# Patient Record
Sex: FEMALE | Race: WHITE | NOT HISPANIC OR LATINO | Employment: OTHER | URBAN - METROPOLITAN AREA
[De-identification: names, ages, dates, MRNs, and addresses within clinical notes are randomized per-mention and may not be internally consistent; named-entity substitution may affect disease eponyms.]

---

## 2023-08-25 PROBLEM — G47.00 INSOMNIA: Status: ACTIVE | Noted: 2023-08-25

## 2023-08-25 PROBLEM — B00.1 HERPES LABIALIS: Status: ACTIVE | Noted: 2023-08-25

## 2023-08-25 PROBLEM — Z13.89 SCREENING FOR MULTIPLE CONDITIONS: Status: ACTIVE | Noted: 2023-08-25

## 2023-08-25 PROBLEM — K76.0 FATTY LIVER: Status: ACTIVE | Noted: 2023-08-25

## 2023-08-25 PROBLEM — M51.9 LUMBAR DISC DISEASE: Status: ACTIVE | Noted: 2023-08-25

## 2023-08-25 PROBLEM — R91.8 LUNG NODULES: Status: ACTIVE | Noted: 2023-08-25

## 2023-08-25 PROBLEM — Z87.42 H/O ABNORMAL CERVICAL PAPANICOLAOU SMEAR: Status: ACTIVE | Noted: 2023-08-25

## 2023-08-25 PROBLEM — Z86.0100 HISTORY OF COLONIC POLYPS: Status: ACTIVE | Noted: 2023-08-25

## 2023-08-25 PROBLEM — F41.9 ANXIETY: Status: ACTIVE | Noted: 2023-08-25

## 2023-08-25 PROBLEM — R93.1 ABNORMAL SCREENING CARDIAC CT: Status: ACTIVE | Noted: 2023-08-25

## 2023-08-25 PROBLEM — K64.9 HEMORRHOIDS: Status: ACTIVE | Noted: 2023-08-25

## 2023-08-25 PROBLEM — Z71.89 CARDIAC RISK COUNSELING: Status: ACTIVE | Noted: 2023-08-25

## 2023-08-25 PROBLEM — R87.622 PAPANICOLAOU SMEAR OF VAGINA WITH LOW GRADE SQUAMOUS INTRAEPITHELIAL LESION (LGSIL): Status: ACTIVE | Noted: 2023-08-25

## 2023-08-25 PROBLEM — F32.A DEPRESSION: Status: ACTIVE | Noted: 2023-08-25

## 2023-08-25 PROBLEM — Z00.00 ROUTINE ADULT HEALTH MAINTENANCE: Status: ACTIVE | Noted: 2023-08-25

## 2023-08-25 PROBLEM — I10 HYPERTENSION, ESSENTIAL, BENIGN: Status: ACTIVE | Noted: 2023-08-25

## 2023-08-25 PROBLEM — L50.9 URTICARIA: Status: ACTIVE | Noted: 2023-08-25

## 2023-08-25 PROBLEM — Z78.0 MENOPAUSE: Status: ACTIVE | Noted: 2023-08-25

## 2023-08-25 PROBLEM — E78.2 HYPERLIPIDEMIA, MIXED: Status: ACTIVE | Noted: 2023-08-25

## 2023-08-25 PROBLEM — N95.2 VAGINAL ATROPHY: Status: ACTIVE | Noted: 2023-08-25

## 2023-08-25 PROBLEM — Z71.89 ADVANCED DIRECTIVES, COUNSELING/DISCUSSION: Status: ACTIVE | Noted: 2023-08-25

## 2023-08-25 PROBLEM — G43.909 MIGRAINE: Status: ACTIVE | Noted: 2023-08-25

## 2023-08-25 PROBLEM — R73.01 IFG (IMPAIRED FASTING GLUCOSE): Status: ACTIVE | Noted: 2023-08-25

## 2023-08-25 PROBLEM — T78.3XXA ANGIOEDEMA: Status: ACTIVE | Noted: 2023-08-25

## 2023-08-25 PROBLEM — Z86.010 HISTORY OF COLONIC POLYPS: Status: ACTIVE | Noted: 2023-08-25

## 2023-08-25 PROBLEM — E55.9 VITAMIN D DEFICIENCY: Status: ACTIVE | Noted: 2023-08-25

## 2023-08-25 PROBLEM — M48.061 LUMBAR STENOSIS: Status: ACTIVE | Noted: 2023-08-25

## 2023-10-11 ENCOUNTER — TELEPHONE (OUTPATIENT)
Dept: OBSTETRICS AND GYNECOLOGY | Facility: CLINIC | Age: 68
End: 2023-10-11
Payer: MEDICARE

## 2023-10-11 DIAGNOSIS — Z78.0 MENOPAUSE: ICD-10-CM

## 2023-10-11 DIAGNOSIS — Z78.0 MENOPAUSE: Primary | ICD-10-CM

## 2023-10-11 RX ORDER — ESTRADIOL 0.05 MG/D
1 FILM, EXTENDED RELEASE TRANSDERMAL
Qty: 4 PATCH | Refills: 11 | Status: SHIPPED | OUTPATIENT
Start: 2023-10-11 | End: 2023-10-12

## 2023-10-12 RX ORDER — ESTRADIOL 0.05 MG/D
PATCH TRANSDERMAL
Qty: 4 PATCH | Refills: 11 | Status: SHIPPED | OUTPATIENT
Start: 2023-10-12 | End: 2024-02-09 | Stop reason: HOSPADM

## 2023-10-12 NOTE — TELEPHONE ENCOUNTER
Pharm sent electronic request for 0.05mg estradiol patch use. Verbal Clarification for new patch q 96 hrs (or 3-4d) given.

## 2023-10-16 ENCOUNTER — TELEPHONE (OUTPATIENT)
Dept: OBSTETRICS AND GYNECOLOGY | Facility: CLINIC | Age: 68
End: 2023-10-16
Payer: MEDICARE

## 2023-10-16 NOTE — TELEPHONE ENCOUNTER
Pt new Rx for estradiol patch states use 1 patch every 96hrs one a week. Pt states that she normally changes the patch twice a week. She was advised to stick with her application schedule.

## 2023-11-09 DIAGNOSIS — Z78.0 MENOPAUSE: ICD-10-CM

## 2023-11-09 RX ORDER — ESTRADIOL 0.05 MG/D
FILM, EXTENDED RELEASE TRANSDERMAL
Qty: 8 PATCH | Refills: 0 | Status: SHIPPED | OUTPATIENT
Start: 2023-11-09 | End: 2024-01-02

## 2023-11-10 NOTE — TELEPHONE ENCOUNTER
Pt contacted.     Pt informed dr thomas reviewed her pap from 9/20/23.   Provider advises pt repeat pap and HPV test in one year.     Understanding voiced.

## 2023-12-31 DIAGNOSIS — Z78.0 MENOPAUSE: ICD-10-CM

## 2024-01-02 RX ORDER — ESTRADIOL 0.05 MG/D
1 FILM, EXTENDED RELEASE TRANSDERMAL 2 TIMES WEEKLY
Qty: 24 PATCH | Refills: 3 | Status: SHIPPED | OUTPATIENT
Start: 2024-01-04

## 2024-02-08 ENCOUNTER — APPOINTMENT (OUTPATIENT)
Dept: CARDIOLOGY | Facility: HOSPITAL | Age: 69
End: 2024-02-08
Payer: MEDICARE

## 2024-02-08 ENCOUNTER — HOSPITAL ENCOUNTER (OUTPATIENT)
Facility: HOSPITAL | Age: 69
Setting detail: OBSERVATION
Discharge: HOME | End: 2024-02-09
Attending: EMERGENCY MEDICINE | Admitting: STUDENT IN AN ORGANIZED HEALTH CARE EDUCATION/TRAINING PROGRAM
Payer: MEDICARE

## 2024-02-08 ENCOUNTER — APPOINTMENT (OUTPATIENT)
Dept: RADIOLOGY | Facility: HOSPITAL | Age: 69
End: 2024-02-08
Payer: MEDICARE

## 2024-02-08 DIAGNOSIS — R07.9 CHEST PAIN, UNSPECIFIED TYPE: Primary | ICD-10-CM

## 2024-02-08 DIAGNOSIS — I20.89 OTHER FORMS OF ANGINA PECTORIS (CMS-HCC): ICD-10-CM

## 2024-02-08 LAB
ALBUMIN SERPL BCP-MCNC: 4.1 G/DL (ref 3.4–5)
ALP SERPL-CCNC: 38 U/L (ref 33–136)
ALT SERPL W P-5'-P-CCNC: 16 U/L (ref 7–45)
ANION GAP SERPL CALC-SCNC: 13 MMOL/L (ref 10–20)
ANION GAP SERPL CALC-SCNC: 14 MMOL/L (ref 10–20)
APTT PPP: 30 SECONDS (ref 27–38)
AST SERPL W P-5'-P-CCNC: 30 U/L (ref 9–39)
BASOPHILS # BLD AUTO: 0.05 X10*3/UL (ref 0–0.1)
BASOPHILS NFR BLD AUTO: 1.1 %
BILIRUB SERPL-MCNC: 0.5 MG/DL (ref 0–1.2)
BNP SERPL-MCNC: 8 PG/ML (ref 0–99)
BUN SERPL-MCNC: 20 MG/DL (ref 6–23)
BUN SERPL-MCNC: 21 MG/DL (ref 6–23)
CALCIUM SERPL-MCNC: 8.7 MG/DL (ref 8.6–10.3)
CALCIUM SERPL-MCNC: 9.3 MG/DL (ref 8.6–10.3)
CARDIAC TROPONIN I PNL SERPL HS: <3 NG/L (ref 0–13)
CHLORIDE SERPL-SCNC: 100 MMOL/L (ref 98–107)
CHLORIDE SERPL-SCNC: 99 MMOL/L (ref 98–107)
CO2 SERPL-SCNC: 27 MMOL/L (ref 21–32)
CO2 SERPL-SCNC: 28 MMOL/L (ref 21–32)
CREAT SERPL-MCNC: 0.53 MG/DL (ref 0.5–1.05)
CREAT SERPL-MCNC: 0.59 MG/DL (ref 0.5–1.05)
EGFRCR SERPLBLD CKD-EPI 2021: >90 ML/MIN/1.73M*2
EGFRCR SERPLBLD CKD-EPI 2021: >90 ML/MIN/1.73M*2
EOSINOPHIL # BLD AUTO: 0.12 X10*3/UL (ref 0–0.4)
EOSINOPHIL NFR BLD AUTO: 2.7 %
ERYTHROCYTE [DISTWIDTH] IN BLOOD BY AUTOMATED COUNT: 12.3 % (ref 11.5–14.5)
ERYTHROCYTE [DISTWIDTH] IN BLOOD BY AUTOMATED COUNT: 12.4 % (ref 11.5–14.5)
EST. AVERAGE GLUCOSE BLD GHB EST-MCNC: 117 MG/DL
GLUCOSE SERPL-MCNC: 104 MG/DL (ref 74–99)
GLUCOSE SERPL-MCNC: 142 MG/DL (ref 74–99)
HBA1C MFR BLD: 5.7 %
HCT VFR BLD AUTO: 43.9 % (ref 36–46)
HCT VFR BLD AUTO: 46 % (ref 36–46)
HGB BLD-MCNC: 15.1 G/DL (ref 12–16)
HGB BLD-MCNC: 16 G/DL (ref 12–16)
IMM GRANULOCYTES # BLD AUTO: 0.03 X10*3/UL (ref 0–0.5)
IMM GRANULOCYTES NFR BLD AUTO: 0.7 % (ref 0–0.9)
INR PPP: 1 (ref 0.9–1.1)
LYMPHOCYTES # BLD AUTO: 1.49 X10*3/UL (ref 0.8–3)
LYMPHOCYTES NFR BLD AUTO: 34.1 %
MCH RBC QN AUTO: 30.8 PG (ref 26–34)
MCH RBC QN AUTO: 31.3 PG (ref 26–34)
MCHC RBC AUTO-ENTMCNC: 34.4 G/DL (ref 32–36)
MCHC RBC AUTO-ENTMCNC: 34.8 G/DL (ref 32–36)
MCV RBC AUTO: 89 FL (ref 80–100)
MCV RBC AUTO: 90 FL (ref 80–100)
MONOCYTES # BLD AUTO: 0.38 X10*3/UL (ref 0.05–0.8)
MONOCYTES NFR BLD AUTO: 8.7 %
NEUTROPHILS # BLD AUTO: 2.3 X10*3/UL (ref 1.6–5.5)
NEUTROPHILS NFR BLD AUTO: 52.7 %
NRBC BLD-RTO: 0 /100 WBCS (ref 0–0)
NRBC BLD-RTO: 0 /100 WBCS (ref 0–0)
PLATELET # BLD AUTO: 114 X10*3/UL (ref 150–450)
PLATELET # BLD AUTO: 124 X10*3/UL (ref 150–450)
POTASSIUM SERPL-SCNC: 3.1 MMOL/L (ref 3.5–5.3)
POTASSIUM SERPL-SCNC: 5.3 MMOL/L (ref 3.5–5.3)
PROT SERPL-MCNC: 6.6 G/DL (ref 6.4–8.2)
PROTHROMBIN TIME: 11 SECONDS (ref 9.8–12.8)
RBC # BLD AUTO: 4.91 X10*6/UL (ref 4–5.2)
RBC # BLD AUTO: 5.11 X10*6/UL (ref 4–5.2)
SODIUM SERPL-SCNC: 134 MMOL/L (ref 136–145)
SODIUM SERPL-SCNC: 139 MMOL/L (ref 136–145)
WBC # BLD AUTO: 4.4 X10*3/UL (ref 4.4–11.3)
WBC # BLD AUTO: 5.4 X10*3/UL (ref 4.4–11.3)

## 2024-02-08 PROCEDURE — 36415 COLL VENOUS BLD VENIPUNCTURE: CPT | Performed by: PHYSICIAN ASSISTANT

## 2024-02-08 PROCEDURE — 71046 X-RAY EXAM CHEST 2 VIEWS: CPT | Mod: FY

## 2024-02-08 PROCEDURE — 99223 1ST HOSP IP/OBS HIGH 75: CPT | Performed by: NURSE PRACTITIONER

## 2024-02-08 PROCEDURE — 84484 ASSAY OF TROPONIN QUANT: CPT | Performed by: STUDENT IN AN ORGANIZED HEALTH CARE EDUCATION/TRAINING PROGRAM

## 2024-02-08 PROCEDURE — G0378 HOSPITAL OBSERVATION PER HR: HCPCS

## 2024-02-08 PROCEDURE — 85610 PROTHROMBIN TIME: CPT | Performed by: NURSE PRACTITIONER

## 2024-02-08 PROCEDURE — 2500000001 HC RX 250 WO HCPCS SELF ADMINISTERED DRUGS (ALT 637 FOR MEDICARE OP): Performed by: STUDENT IN AN ORGANIZED HEALTH CARE EDUCATION/TRAINING PROGRAM

## 2024-02-08 PROCEDURE — 99285 EMERGENCY DEPT VISIT HI MDM: CPT | Performed by: PHYSICIAN ASSISTANT

## 2024-02-08 PROCEDURE — 99285 EMERGENCY DEPT VISIT HI MDM: CPT | Mod: 25,27 | Performed by: EMERGENCY MEDICINE

## 2024-02-08 PROCEDURE — 2500000004 HC RX 250 GENERAL PHARMACY W/ HCPCS (ALT 636 FOR OP/ED): Performed by: NURSE PRACTITIONER

## 2024-02-08 PROCEDURE — 83036 HEMOGLOBIN GLYCOSYLATED A1C: CPT | Mod: STJLAB | Performed by: NURSE PRACTITIONER

## 2024-02-08 PROCEDURE — 71046 X-RAY EXAM CHEST 2 VIEWS: CPT | Mod: COMPUTED RADIOGRAPHY X-RAY | Performed by: RADIOLOGY

## 2024-02-08 PROCEDURE — 93010 ELECTROCARDIOGRAM REPORT: CPT | Performed by: PHYSICIAN ASSISTANT

## 2024-02-08 PROCEDURE — 85027 COMPLETE CBC AUTOMATED: CPT | Performed by: NURSE PRACTITIONER

## 2024-02-08 PROCEDURE — 84484 ASSAY OF TROPONIN QUANT: CPT | Performed by: PHYSICIAN ASSISTANT

## 2024-02-08 PROCEDURE — 94760 N-INVAS EAR/PLS OXIMETRY 1: CPT

## 2024-02-08 PROCEDURE — 2500000004 HC RX 250 GENERAL PHARMACY W/ HCPCS (ALT 636 FOR OP/ED): Performed by: INTERNAL MEDICINE

## 2024-02-08 PROCEDURE — 82374 ASSAY BLOOD CARBON DIOXIDE: CPT | Performed by: NURSE PRACTITIONER

## 2024-02-08 PROCEDURE — 93005 ELECTROCARDIOGRAM TRACING: CPT

## 2024-02-08 PROCEDURE — 96372 THER/PROPH/DIAG INJ SC/IM: CPT

## 2024-02-08 PROCEDURE — 80053 COMPREHEN METABOLIC PANEL: CPT | Performed by: PHYSICIAN ASSISTANT

## 2024-02-08 PROCEDURE — 83880 ASSAY OF NATRIURETIC PEPTIDE: CPT | Performed by: PHYSICIAN ASSISTANT

## 2024-02-08 PROCEDURE — 85025 COMPLETE CBC W/AUTO DIFF WBC: CPT | Performed by: PHYSICIAN ASSISTANT

## 2024-02-08 RX ORDER — ATORVASTATIN CALCIUM 80 MG/1
80 TABLET, FILM COATED ORAL NIGHTLY
Status: DISCONTINUED | OUTPATIENT
Start: 2024-02-08 | End: 2024-02-09 | Stop reason: HOSPADM

## 2024-02-08 RX ORDER — PRAVASTATIN SODIUM 20 MG/1
20 TABLET ORAL DAILY
COMMUNITY

## 2024-02-08 RX ORDER — POLYETHYLENE GLYCOL 3350 17 G/17G
17 POWDER, FOR SOLUTION ORAL DAILY PRN
Status: DISCONTINUED | OUTPATIENT
Start: 2024-02-08 | End: 2024-02-09 | Stop reason: HOSPADM

## 2024-02-08 RX ORDER — ATORVASTATIN CALCIUM 80 MG/1
80 TABLET, FILM COATED ORAL DAILY
Status: DISCONTINUED | OUTPATIENT
Start: 2024-02-09 | End: 2024-02-08

## 2024-02-08 RX ORDER — NITROGLYCERIN 0.4 MG/1
0.4 TABLET SUBLINGUAL EVERY 5 MIN PRN
Status: DISCONTINUED | OUTPATIENT
Start: 2024-02-08 | End: 2024-02-09 | Stop reason: HOSPADM

## 2024-02-08 RX ORDER — TRAZODONE HYDROCHLORIDE 50 MG/1
50 TABLET ORAL NIGHTLY PRN
Status: DISCONTINUED | OUTPATIENT
Start: 2024-02-08 | End: 2024-02-09 | Stop reason: HOSPADM

## 2024-02-08 RX ORDER — LOSARTAN POTASSIUM 100 MG/1
100 TABLET ORAL DAILY
COMMUNITY

## 2024-02-08 RX ORDER — ACETAMINOPHEN 650 MG/1
650 SUPPOSITORY RECTAL EVERY 4 HOURS PRN
Status: DISCONTINUED | OUTPATIENT
Start: 2024-02-08 | End: 2024-02-09 | Stop reason: HOSPADM

## 2024-02-08 RX ORDER — HEPARIN SODIUM 5000 [USP'U]/ML
5000 INJECTION, SOLUTION INTRAVENOUS; SUBCUTANEOUS EVERY 8 HOURS SCHEDULED
Status: DISCONTINUED | OUTPATIENT
Start: 2024-02-08 | End: 2024-02-09 | Stop reason: HOSPADM

## 2024-02-08 RX ORDER — ACETAMINOPHEN 325 MG/1
650 TABLET ORAL EVERY 4 HOURS PRN
Status: DISCONTINUED | OUTPATIENT
Start: 2024-02-08 | End: 2024-02-09 | Stop reason: HOSPADM

## 2024-02-08 RX ORDER — EPINEPHRINE 0.22MG
100 AEROSOL WITH ADAPTER (ML) INHALATION DAILY
COMMUNITY

## 2024-02-08 RX ORDER — GABAPENTIN 300 MG/1
300 CAPSULE ORAL 2 TIMES DAILY
Status: DISCONTINUED | OUTPATIENT
Start: 2024-02-08 | End: 2024-02-09 | Stop reason: HOSPADM

## 2024-02-08 RX ORDER — POTASSIUM CHLORIDE 750 MG/1
10 CAPSULE, EXTENDED RELEASE ORAL 2 TIMES DAILY
COMMUNITY

## 2024-02-08 RX ORDER — MORPHINE SULFATE 2 MG/ML
2 INJECTION, SOLUTION INTRAMUSCULAR; INTRAVENOUS EVERY 5 MIN PRN
Status: DISCONTINUED | OUTPATIENT
Start: 2024-02-08 | End: 2024-02-09 | Stop reason: HOSPADM

## 2024-02-08 RX ORDER — ACETAMINOPHEN 160 MG/5ML
650 SOLUTION ORAL EVERY 4 HOURS PRN
Status: DISCONTINUED | OUTPATIENT
Start: 2024-02-08 | End: 2024-02-09 | Stop reason: HOSPADM

## 2024-02-08 RX ORDER — NAPROXEN SODIUM 220 MG/1
81 TABLET, FILM COATED ORAL DAILY
Status: DISCONTINUED | OUTPATIENT
Start: 2024-02-09 | End: 2024-02-09 | Stop reason: HOSPADM

## 2024-02-08 RX ORDER — GABAPENTIN 300 MG/1
300 CAPSULE ORAL 2 TIMES DAILY
COMMUNITY

## 2024-02-08 RX ORDER — CHLORTHALIDONE 25 MG/1
25 TABLET ORAL DAILY
COMMUNITY

## 2024-02-08 RX ORDER — TRAZODONE HYDROCHLORIDE 50 MG/1
50 TABLET ORAL NIGHTLY PRN
COMMUNITY

## 2024-02-08 RX ORDER — CHLORTHALIDONE 25 MG/1
25 TABLET ORAL DAILY
Status: DISCONTINUED | OUTPATIENT
Start: 2024-02-08 | End: 2024-02-09 | Stop reason: HOSPADM

## 2024-02-08 RX ORDER — POTASSIUM CHLORIDE 20 MEQ/1
60 TABLET, EXTENDED RELEASE ORAL ONCE
Status: COMPLETED | OUTPATIENT
Start: 2024-02-08 | End: 2024-02-08

## 2024-02-08 RX ORDER — LOSARTAN POTASSIUM 100 MG/1
100 TABLET ORAL DAILY
Status: DISCONTINUED | OUTPATIENT
Start: 2024-02-08 | End: 2024-02-09 | Stop reason: HOSPADM

## 2024-02-08 RX ORDER — METOPROLOL TARTRATE 25 MG/1
25 TABLET, FILM COATED ORAL 2 TIMES DAILY
Status: DISCONTINUED | OUTPATIENT
Start: 2024-02-08 | End: 2024-02-09 | Stop reason: HOSPADM

## 2024-02-08 RX ORDER — ATORVASTATIN CALCIUM 80 MG/1
80 TABLET, FILM COATED ORAL DAILY
Status: DISCONTINUED | OUTPATIENT
Start: 2024-02-08 | End: 2024-02-08

## 2024-02-08 RX ORDER — ONDANSETRON HYDROCHLORIDE 2 MG/ML
4 INJECTION, SOLUTION INTRAVENOUS EVERY 6 HOURS PRN
Status: DISCONTINUED | OUTPATIENT
Start: 2024-02-08 | End: 2024-02-09 | Stop reason: HOSPADM

## 2024-02-08 RX ADMIN — GABAPENTIN 300 MG: 300 CAPSULE ORAL at 20:20

## 2024-02-08 RX ADMIN — HEPARIN SODIUM 5000 UNITS: 5000 INJECTION INTRAVENOUS; SUBCUTANEOUS at 16:40

## 2024-02-08 RX ADMIN — CHLORTHALIDONE 25 MG: 25 TABLET ORAL at 17:36

## 2024-02-08 RX ADMIN — METOPROLOL TARTRATE 25 MG: 25 TABLET, FILM COATED ORAL at 16:40

## 2024-02-08 RX ADMIN — HEPARIN SODIUM 5000 UNITS: 5000 INJECTION INTRAVENOUS; SUBCUTANEOUS at 23:12

## 2024-02-08 RX ADMIN — LOSARTAN POTASSIUM 100 MG: 100 TABLET, FILM COATED ORAL at 16:40

## 2024-02-08 RX ADMIN — POTASSIUM CHLORIDE 60 MEQ: 1500 TABLET, EXTENDED RELEASE ORAL at 23:11

## 2024-02-08 SDOH — SOCIAL STABILITY: SOCIAL INSECURITY: HAVE YOU HAD THOUGHTS OF HARMING ANYONE ELSE?: NO

## 2024-02-08 SDOH — SOCIAL STABILITY: SOCIAL INSECURITY: HAS ANYONE EVER THREATENED TO HURT YOUR FAMILY OR YOUR PETS?: NO

## 2024-02-08 SDOH — SOCIAL STABILITY: SOCIAL INSECURITY: DO YOU FEEL ANYONE HAS EXPLOITED OR TAKEN ADVANTAGE OF YOU FINANCIALLY OR OF YOUR PERSONAL PROPERTY?: NO

## 2024-02-08 SDOH — SOCIAL STABILITY: SOCIAL INSECURITY: DO YOU FEEL UNSAFE GOING BACK TO THE PLACE WHERE YOU ARE LIVING?: NO

## 2024-02-08 SDOH — SOCIAL STABILITY: SOCIAL INSECURITY: DOES ANYONE TRY TO KEEP YOU FROM HAVING/CONTACTING OTHER FRIENDS OR DOING THINGS OUTSIDE YOUR HOME?: NO

## 2024-02-08 SDOH — SOCIAL STABILITY: SOCIAL INSECURITY: ARE YOU OR HAVE YOU BEEN THREATENED OR ABUSED PHYSICALLY, EMOTIONALLY, OR SEXUALLY BY ANYONE?: NO

## 2024-02-08 SDOH — SOCIAL STABILITY: SOCIAL INSECURITY: ARE THERE ANY APPARENT SIGNS OF INJURIES/BEHAVIORS THAT COULD BE RELATED TO ABUSE/NEGLECT?: NO

## 2024-02-08 SDOH — SOCIAL STABILITY: SOCIAL INSECURITY: ABUSE: ADULT

## 2024-02-08 SDOH — SOCIAL STABILITY: SOCIAL INSECURITY: WERE YOU ABLE TO COMPLETE ALL THE BEHAVIORAL HEALTH SCREENINGS?: YES

## 2024-02-08 ASSESSMENT — COGNITIVE AND FUNCTIONAL STATUS - GENERAL
PATIENT BASELINE BEDBOUND: NO
MOBILITY SCORE: 24
DAILY ACTIVITIY SCORE: 24

## 2024-02-08 ASSESSMENT — ENCOUNTER SYMPTOMS
COUGH: 0
CHEST TIGHTNESS: 0
ENDOCRINE NEGATIVE: 1
NEUROLOGICAL NEGATIVE: 1
EYES NEGATIVE: 1
SHORTNESS OF BREATH: 0
CHILLS: 0
PALPITATIONS: 0
MUSCULOSKELETAL NEGATIVE: 1
FATIGUE: 0
ACTIVITY CHANGE: 0
UNEXPECTED WEIGHT CHANGE: 0
GASTROINTESTINAL NEGATIVE: 1
PSYCHIATRIC NEGATIVE: 1

## 2024-02-08 ASSESSMENT — ACTIVITIES OF DAILY LIVING (ADL)
FEEDING YOURSELF: INDEPENDENT
JUDGMENT_ADEQUATE_SAFELY_COMPLETE_DAILY_ACTIVITIES: YES
LACK_OF_TRANSPORTATION: PATIENT DECLINED
TOILETING: INDEPENDENT
GROOMING: INDEPENDENT
HEARING - RIGHT EAR: FUNCTIONAL
PATIENT'S MEMORY ADEQUATE TO SAFELY COMPLETE DAILY ACTIVITIES?: YES
WALKS IN HOME: INDEPENDENT
DRESSING YOURSELF: INDEPENDENT
ADEQUATE_TO_COMPLETE_ADL: YES
LACK_OF_TRANSPORTATION: PATIENT DECLINED
BATHING: INDEPENDENT
HEARING - LEFT EAR: FUNCTIONAL

## 2024-02-08 ASSESSMENT — LIFESTYLE VARIABLES
HAS A RELATIVE, FRIEND, DOCTOR, OR ANOTHER HEALTH PROFESSIONAL EXPRESSED CONCERN ABOUT YOUR DRINKING OR SUGGESTED YOU CUT DOWN: NO
SKIP TO QUESTIONS 9-10: 1
HOW OFTEN DO YOU HAVE 6 OR MORE DRINKS ON ONE OCCASION: NEVER
HOW MANY STANDARD DRINKS CONTAINING ALCOHOL DO YOU HAVE ON A TYPICAL DAY: 1 OR 2
HOW OFTEN DURING THE LAST YEAR HAVE YOU FOUND THAT YOU WERE NOT ABLE TO STOP DRINKING ONCE YOU HAD STARTED: NEVER
HOW OFTEN DURING THE LAST YEAR HAVE YOU BEEN UNABLE TO REMEMBER WHAT HAPPENED THE NIGHT BEFORE BECAUSE YOU HAD BEEN DRINKING: NEVER
EVER HAD A DRINK FIRST THING IN THE MORNING TO STEADY YOUR NERVES TO GET RID OF A HANGOVER: NO
AUDIT TOTAL SCORE: 0
HAVE YOU OR SOMEONE ELSE BEEN INJURED AS A RESULT OF YOUR DRINKING: NO
AUDIT TOTAL SCORE: 3
HOW OFTEN DURING THE LAST YEAR HAVE YOU HAD A FEELING OF GUILT OR REMORSE AFTER DRINKING: NEVER
EVER FELT BAD OR GUILTY ABOUT YOUR DRINKING: NO
HOW OFTEN DO YOU HAVE A DRINK CONTAINING ALCOHOL: 2-3 TIMES A WEEK
AUDIT-C TOTAL SCORE: 3
HOW OFTEN DURING THE LAST YEAR HAVE YOU FAILED TO DO WHAT WAS NORMALLY EXPECTED FROM YOU BECAUSE OF DRINKING: NEVER
HAVE PEOPLE ANNOYED YOU BY CRITICIZING YOUR DRINKING: NO
HAVE YOU EVER FELT YOU SHOULD CUT DOWN ON YOUR DRINKING: NO
AUDIT-C TOTAL SCORE: 3
HOW OFTEN DURING THE LAST YEAR HAVE YOU NEEDED AN ALCOHOLIC DRINK FIRST THING IN THE MORNING TO GET YOURSELF GOING AFTER A NIGHT OF HEAVY DRINKING: NEVER

## 2024-02-08 ASSESSMENT — PATIENT HEALTH QUESTIONNAIRE - PHQ9
1. LITTLE INTEREST OR PLEASURE IN DOING THINGS: NOT AT ALL
SUM OF ALL RESPONSES TO PHQ9 QUESTIONS 1 & 2: 0
2. FEELING DOWN, DEPRESSED OR HOPELESS: NOT AT ALL

## 2024-02-08 ASSESSMENT — PAIN - FUNCTIONAL ASSESSMENT
PAIN_FUNCTIONAL_ASSESSMENT: 0-10

## 2024-02-08 ASSESSMENT — PAIN SCALES - GENERAL
PAINLEVEL_OUTOF10: 0 - NO PAIN
PAINLEVEL_OUTOF10: 0 - NO PAIN

## 2024-02-08 ASSESSMENT — COLUMBIA-SUICIDE SEVERITY RATING SCALE - C-SSRS
6. HAVE YOU EVER DONE ANYTHING, STARTED TO DO ANYTHING, OR PREPARED TO DO ANYTHING TO END YOUR LIFE?: NO
6. HAVE YOU EVER DONE ANYTHING, STARTED TO DO ANYTHING, OR PREPARED TO DO ANYTHING TO END YOUR LIFE?: NO
1. IN THE PAST MONTH, HAVE YOU WISHED YOU WERE DEAD OR WISHED YOU COULD GO TO SLEEP AND NOT WAKE UP?: NO
2. HAVE YOU ACTUALLY HAD ANY THOUGHTS OF KILLING YOURSELF?: NO
1. IN THE PAST MONTH, HAVE YOU WISHED YOU WERE DEAD OR WISHED YOU COULD GO TO SLEEP AND NOT WAKE UP?: NO
2. HAVE YOU ACTUALLY HAD ANY THOUGHTS OF KILLING YOURSELF?: NO

## 2024-02-08 NOTE — H&P
"History Of Present Illness  Jen Simpson is a 69 y.o. female presents to [facility] ER with history of hypertension hyperlipidemia, who presents today for evaluation of chest pain that started while sitting having a cup of coffee. Patient states she experienced left arm pain that radiated to her left side of the chest, described as an \"ache\" rated a 5 out of 1-10. She described some shortness of breath, no nausea, vomiting or diaphoresis. She lives in [place] and here visiting her daughter. EMS was called, they gave her 4 aspirins and nitroglycerin, the nitroglycerin. She believes the chest pain resolved enroute but prior to nitroglycerin administration. She does use estrogen patches. No recent surgeries hospitalizations. Had recent travel aside from a 2-hour flight from [place] to [place] and then [place] to [place] during which patient was walking around every 2 hours, per ER report.   She denies history of diabetes or family history of heart disease. Patient admitted for ACS r/o, cardiology consulted     Past Medical History  Hyperlipemia, hypertension, depression, anxiety    Surgical History  She has a past surgical history that includes Tonsillectomy (06/06/2014); Back surgery (06/06/2014); Hysterectomy (06/06/2014); Biopsy skin / SQ / mucous membrane (09/22/2022); Colonoscopy (08/22/2017); and Skin biopsy (10/03/2014).     Social History  She reports that she has never smoked. She has never used smokeless tobacco. She reports that she does not use drugs. No history on file for alcohol use.    Family History  No family history on file.     Allergies  Patient has no known allergies.    Review of Systems   Constitutional:  Negative for activity change, chills, fatigue and unexpected weight change.   HENT: Negative.     Eyes: Negative.    Respiratory:  Negative for cough, chest tightness and shortness of breath.    Cardiovascular:  Positive for chest pain. Negative for palpitations and leg swelling. " "  Gastrointestinal: Negative.    Endocrine: Negative.    Genitourinary: Negative.    Musculoskeletal: Negative.    Neurological: Negative.    Psychiatric/Behavioral: Negative.        ROS: 12 systems reviewed and negative except per HPI above     Physical Exam by System:     Constitutional: Well developed, awake/alert/oriented x3, no distress, alert and cooperative   ENMT: mucous membranes moist   Head/Neck: Neck supple   Respiratory/Thorax: Patent airways, CTAB, normal breath sounds with good chest expansion, thorax symmetric   Cardiovascular: Regular, rate and rhythm, no murmurs, 2+ equal pulses of the extremities, normal S 1and S 2   Gastrointestinal: Nondistended, soft, non-tender, no rebound tenderness or guarding, no masses palpable, no organomegaly, +BS, no bruits   Musculoskeletal: ROM intact, no joint swelling, normal strength   Extremities: normal extremities, no cyanosis edema, contusions or wounds, no clubbing   Neurological: alert and oriented x3, intact senses, motor, response and reflexes, normal strength       Last Recorded Vitals  Blood pressure 142/72, pulse 82, temperature 36.7 °C (98.1 °F), resp. rate 16, height 1.676 m (5' 6\"), weight 68.9 kg (152 lb), SpO2 96 %.    Relevant Results  Results for orders placed or performed during the hospital encounter of 02/08/24 (from the past 24 hour(s))   CBC and Auto Differential   Result Value Ref Range    WBC 4.4 4.4 - 11.3 x10*3/uL    nRBC 0.0 0.0 - 0.0 /100 WBCs    RBC 4.91 4.00 - 5.20 x10*6/uL    Hemoglobin 15.1 12.0 - 16.0 g/dL    Hematocrit 43.9 36.0 - 46.0 %    MCV 89 80 - 100 fL    MCH 30.8 26.0 - 34.0 pg    MCHC 34.4 32.0 - 36.0 g/dL    RDW 12.4 11.5 - 14.5 %    Platelets 114 (L) 150 - 450 x10*3/uL    Neutrophils % 52.7 40.0 - 80.0 %    Immature Granulocytes %, Automated 0.7 0.0 - 0.9 %    Lymphocytes % 34.1 13.0 - 44.0 %    Monocytes % 8.7 2.0 - 10.0 %    Eosinophils % 2.7 0.0 - 6.0 %    Basophils % 1.1 0.0 - 2.0 %    Neutrophils Absolute 2.30 1.60 " - 5.50 x10*3/uL    Immature Granulocytes Absolute, Automated 0.03 0.00 - 0.50 x10*3/uL    Lymphocytes Absolute 1.49 0.80 - 3.00 x10*3/uL    Monocytes Absolute 0.38 0.05 - 0.80 x10*3/uL    Eosinophils Absolute 0.12 0.00 - 0.40 x10*3/uL    Basophils Absolute 0.05 0.00 - 0.10 x10*3/uL   Comprehensive metabolic panel   Result Value Ref Range    Glucose 142 (H) 74 - 99 mg/dL    Sodium 134 (L) 136 - 145 mmol/L    Potassium 5.3 3.5 - 5.3 mmol/L    Chloride 99 98 - 107 mmol/L    Bicarbonate 27 21 - 32 mmol/L    Anion Gap 13 10 - 20 mmol/L    Urea Nitrogen 21 6 - 23 mg/dL    Creatinine 0.53 0.50 - 1.05 mg/dL    eGFR >90 >60 mL/min/1.73m*2    Calcium 8.7 8.6 - 10.3 mg/dL    Albumin 4.1 3.4 - 5.0 g/dL    Alkaline Phosphatase 38 33 - 136 U/L    Total Protein 6.6 6.4 - 8.2 g/dL    AST 30 9 - 39 U/L    Bilirubin, Total 0.5 0.0 - 1.2 mg/dL    ALT 16 7 - 45 U/L   B-type natriuretic peptide   Result Value Ref Range    BNP 8 0 - 99 pg/mL   Troponin I, High Sensitivity, Initial   Result Value Ref Range    Troponin I, High Sensitivity <3 0 - 13 ng/L   Troponin, High Sensitivity, 1 Hour   Result Value Ref Range    Troponin I, High Sensitivity <3 0 - 13 ng/L      Scheduled medications    Continuous medications    PRN medications       XR chest 2 views    Result Date: 2/8/2024  Interpreted By:  Antonio Mcclure, STUDY: XR CHEST 2 VIEWS;  2/8/2024 9:53 am   INDICATION: Signs/Symptoms:cp sob.   COMPARISON: None.   ACCESSION NUMBER(S): [id number]   ORDERING CLINICIAN: CELIA MENDOZA   FINDINGS:     CARDIOMEDIASTINAL SILHOUETTE: Cardiomediastinal silhouette is normal in size and configuration.   LUNGS: No consolidation, pneumothorax, or effusion.   ABDOMEN: No remarkable upper abdominal findings.   BONES: No acute osseous changes.   Remaining findings appear stable since prior comparison radiograph.       1.  No evidence of acute cardiopulmonary process.     Signed by: Antonio Mcclure 2/8/2024 11:00 AM Dictation workstation:   [id number]         Assessment/Plan   Principal Problem:    Chest pain  Active Problems:    Vitamin D deficiency    Fatty liver    Hypertension, essential, benign    Hyperlipidemia, mixed    Anxiety      Plan:    Admit to observation with tele  Cardiology consult to Dr. Diallo  Troponin 3, delta troponin 3  BNP 8  CXR: No evidence of acute cardiopulmonary process.   Nitroglycerin SL for angina, morphine PRN for chest pain  Aspirin daily, atorvastatin, BB  Lipid profile ordered, A1C ordered  Am labs including cbc, bmp, mag  POC discussed with patient and attending  Resume patient home medications when med rec is reconcilled  I spent >50 minutes in the professional and overall care of this patient.    SIGNATURE: TAM Mir PATIENT NAME: Jen Simpson   DATE: February 8, 2024 MRN: [id number]   TIME: 12:23 PM      Cyndi Talamantes CNP  [place] [address]  Phone: [phone] Fax: [phone]

## 2024-02-08 NOTE — ED PROVIDER NOTES
HPI   Chief Complaint   Patient presents with    Chest Pain       Patient is a 69-year-old female with history of hypertension hyperlipidemia, who presents today for evaluation of chest pain that started 2 hours ago, patient states that it was radiating down the left arm.  She had not exerted herself when it started, states she was just sitting.  She did endorse associated needed nausea, lightheadedness and shortness of breath.  Patient was brought in by medics, they gave her 4 aspirins and nitroglycerin, the nitroglycerin made her pain completely improved.  She never had anything like this before.  Denies any pleuritic component, denies leg pain or swelling, history of blood clots, malignancy.  She does use estrogen patches.  No recent surgeries hospitalizations or travel aside from a 2-hour flight from [place] to [place] and then [place] to [place] during which patient was walking around every 2 hours.  No pain into the back.  She denies history of diabetes or family history of heart disease, patient smoked for couple years in college but otherwise does not smoke.  Denies any history of drug use or cocaine.                          Shipman Coma Scale Score: 15                     Patient History   Past Medical History:   Diagnosis Date    Abnormal PET scan of lung 01/14/2010    Abnormal screening cardiac CT     8/21: 1. Coronary artery calcium score of 52.3  (LAD). This corresponds to the 74th percentile for females age 65-69. This compares to a score of 29.1 previously    Abnormal screening cardiac CT 06/26/2015    1. Coronary artery calcium score of 29.* 2. Multiple bilateral pulmonary nodules, the largest measuring 9 mm in the right lower lobe within a cluster of nodules.  While there are features that suggest this represents residua of prior granulomatous infection    H/O bone density study 11/04/2019    Normal    H/O cardiovascular stress test 12/23/2009    NOrmal nuclear stress test , EF 60%    H/O magnetic  resonance imaging of lumbar spine 11/08/2022    H/O x-ray of lumbar spine 05/28/2021    Mild multilevel discogenic degenerative changes.     Past Surgical History:   Procedure Laterality Date    BACK SURGERY  06/06/2014    Back Surgery    BIOPSY SKIN / SQ / MUCOUS MEMBRANE  09/22/2022    .  VAGINAL APEX, BIOPSY: -- SCANT SUPERFICIAL STRIPS OF SQUAMOUS EPITHELIUM; NEGATIVE FOR DYSPLASIA.; 9/10/21: VAGINAL APEX, BIOPSY: -- MINUTE SUPERFICIAL FRAGMENT OF SQUAMOUS EPITHELIUM WITH NO SIGNIFICANT PATHOLOGIC FINDINGS.    COLONOSCOPY  08/22/2017    Hemorrhoids; 4/23/12 Int Hemorrhoids    HYSTERECTOMY  06/06/2014    Hysterectomy    SKIN BIOPSY  10/03/2014    SKIN, MID CHEST, BIOPSY: NEUROFIBROMA, PRESENT ON THE DEEP MARGIN.    TONSILLECTOMY  06/06/2014    Tonsillectomy     No family history on file.  Social History     Tobacco Use    Smoking status: Never    Smokeless tobacco: Never   Substance Use Topics    Alcohol use: Not on file    Drug use: Never       Physical Exam   ED Triage Vitals [02/08/24 0919]   Temperature Heart Rate Respirations BP   36.7 °C (98.1 °F) 82 16 142/72      Pulse Ox Temp src Heart Rate Source Patient Position   96 % -- -- --      BP Location FiO2 (%)     -- --       Physical Exam  Vitals and nursing note reviewed.   Constitutional:       General: She is not in acute distress.     Appearance: Normal appearance. She is not toxic-appearing.   HENT:      Head: Normocephalic and atraumatic.      Nose: Nose normal.   Eyes:      Extraocular Movements: Extraocular movements intact.   Cardiovascular:      Rate and Rhythm: Normal rate and regular rhythm.   Pulmonary:      Effort: Pulmonary effort is normal.      Breath sounds: Normal breath sounds.   Abdominal:      Palpations: Abdomen is soft.   Musculoskeletal:         General: Normal range of motion.      Cervical back: Normal range of motion and neck supple.      Right lower leg: No tenderness. No edema.      Left lower leg: No tenderness. No edema.    Skin:     General: Skin is warm and dry.   Neurological:      General: No focal deficit present.      Mental Status: She is alert.   Psychiatric:         Mood and Affect: Mood normal.         Thought Content: Thought content normal.       XR chest 2 views   Final Result   1.  No evidence of acute cardiopulmonary process.             Signed by: Antonio Mcclure 2/8/2024 11:00 AM   Dictation workstation:   [id number]        Labs Reviewed   CBC WITH AUTO DIFFERENTIAL - Abnormal       Result Value    WBC 4.4      nRBC 0.0      RBC 4.91      Hemoglobin 15.1      Hematocrit 43.9      MCV 89      MCH 30.8      MCHC 34.4      RDW 12.4      Platelets 114 (*)     Neutrophils % 52.7      Immature Granulocytes %, Automated 0.7      Lymphocytes % 34.1      Monocytes % 8.7      Eosinophils % 2.7      Basophils % 1.1      Neutrophils Absolute 2.30      Immature Granulocytes Absolute, Automated 0.03      Lymphocytes Absolute 1.49      Monocytes Absolute 0.38      Eosinophils Absolute 0.12      Basophils Absolute 0.05     COMPREHENSIVE METABOLIC PANEL - Abnormal    Glucose 142 (*)     Sodium 134 (*)     Potassium 5.3      Chloride 99      Bicarbonate 27      Anion Gap 13      Urea Nitrogen 21      Creatinine 0.53      eGFR >90      Calcium 8.7      Albumin 4.1      Alkaline Phosphatase 38      Total Protein 6.6      AST 30      Bilirubin, Total 0.5      ALT 16     CBC - Abnormal    WBC 5.4      nRBC 0.0      RBC 5.11      Hemoglobin 16.0      Hematocrit 46.0      MCV 90      MCH 31.3      MCHC 34.8      RDW 12.3      Platelets 124 (*)    BASIC METABOLIC PANEL - Abnormal    Glucose 104 (*)     Sodium 139      Potassium 3.1 (*)     Chloride 100      Bicarbonate 28      Anion Gap 14      Urea Nitrogen 20      Creatinine 0.59      eGFR >90      Calcium 9.3     B-TYPE NATRIURETIC PEPTIDE - Normal    BNP 8      Narrative:        <100 pg/mL - Heart failure unlikely  100-299 pg/mL - Intermediate probability of acute heart                   failure exacerbation. Correlate with clinical                  context and patient history.    >=300 pg/mL - Heart Failure likely. Correlate with clinical                  context and patient history.    BNP testing is performed using different testing methodology at [facility] than at other [facility]. Direct result comparisons should only be made within the same method.      SERIAL TROPONIN-INITIAL - Normal    Troponin I, High Sensitivity <3      Narrative:     Less than 99th percentile of normal range cutoff-  Female and children under 18 years old <14 ng/L; Male <21 ng/L: Negative  Repeat testing should be performed if clinically indicated.     Female and children under 18 years old 14-50 ng/L; Male 21-50 ng/L:  Consistent with possible cardiac damage and possible increased clinical   risk. Serial measurements may help to assess extent of myocardial damage.     >50 ng/L: Consistent with cardiac damage, increased clinical risk and  myocardial infarction. Serial measurements may help assess extent of   myocardial damage.      NOTE: Children less than 1 year old may have higher baseline troponin   levels and results should be interpreted in conjunction with the overall   clinical context.     NOTE: Troponin I testing is performed using a different   testing methodology at [facility] than at other   [facility]. Direct result comparisons should only   be made within the same method.   SERIAL TROPONIN, 1 HOUR - Normal    Troponin I, High Sensitivity <3      Narrative:     Less than 99th percentile of normal range cutoff-  Female and children under 18 years old <14 ng/L; Male <21 ng/L: Negative  Repeat testing should be performed if clinically indicated.     Female and children under 18 years old 14-50 ng/L; Male 21-50 ng/L:  Consistent with possible cardiac damage and possible increased clinical   risk. Serial measurements may help to assess extent of myocardial damage.     >50  ng/L: Consistent with cardiac damage, increased clinical risk and  myocardial infarction. Serial measurements may help assess extent of   myocardial damage.      NOTE: Children less than 1 year old may have higher baseline troponin   levels and results should be interpreted in conjunction with the overall   clinical context.     NOTE: Troponin I testing is performed using a different   testing methodology at [facility] than at other   [facility]. Direct result comparisons should only   be made within the same method.   COAGULATION SCREEN - Normal    Protime 11.0      INR 1.0      aPTT 30      Narrative:     The APTT is no longer used for monitoring Unfractionated Heparin Therapy. For monitoring Heparin Therapy, use the Heparin Assay.   TROPONIN SERIES- (INITIAL, 1 HR)    Narrative:     The following orders were created for panel order Troponin Series, (0, 1 HR).  Procedure                               Abnormality         Status                     ---------                               -----------         ------                     Troponin I, High Sensiti...[[id number]]  Normal              Final result               Troponin, High Sensitivi...[[id number]]  Normal              Final result                 Please view results for these tests on the individual orders.   HEMOGLOBIN A1C         ED Course & MDM   ED Course as of 02/08/24 1456   Thu Feb 08, 2024   0925 ECG 12 lead  EKG showed normal sinus rhythm with normal axis, rate of 78, , QRS 80, QTc 453, no ischemic changes. [MK]      ED Course User Index  [MK] Angie Mcelroy PA-C         Diagnoses as of 02/08/24 1456   Chest pain, unspecified type       Medical Decision Making      MDM: Patient is a 69-year-old female presents today for evaluation of left-sided chest pain that radiates down the left arm with associated dizziness lightheadedness and nausea that was made better by nitroglycerin concerning for ACS, she is currently chest  pain-free after 1 nitroglycerin by medics, EKG, troponins, proBNP chest x-ray and labs were obtained.  Patient on telemetry and continuous SpO2 monitoring.  Anticipate admission.   CBC was normal, CMP showed a glucose of 143 and a sodium of 134, proBNP was 8, both troponins were negative, EKG showed a normal sinus rhythm without any evidence of ischemia, patient had already received aspirin as well as nitroglycerin and is currently pain-free.  Given her high heart score and concerning sounding story, do feel that she would benefit from admission for provocative testing.        Procedure  Procedures     Angie Mcelroy PA-C  02/08/24 9911    I saw and evaluated the patient. I personally obtained the key and critical portions of the history and physical exam or was physically present for key and critical portions performed by the resident/fellow/MI. I reviewed the resident/fellow/MI's documentation and discussed the patient with the resident/fellow/MI. I agree with the resident/fellow/MI's medical decision making as documented in the note.    ** Please excuse any errors in grammar or translation related to this dictation. Voice recognition software was utilized to prepare this document. **       Rogelio Funk MD  [facility]          Rogelio Funk MD  02/09/24 2795

## 2024-02-08 NOTE — CARE PLAN
The patient's goals for the shift include pain control    The clinical goals for the shift include safety, pain control, monitor vitals    Problem: Pain - Adult  Goal: Verbalizes/displays adequate comfort level or baseline comfort level  Outcome: Progressing     Problem: Safety - Adult  Goal: Free from fall injury  Outcome: Progressing     Problem: Discharge Planning  Goal: Discharge to home or other facility with appropriate resources  Outcome: Progressing     Problem: Chronic Conditions and Co-morbidities  Goal: Patient's chronic conditions and co-morbidity symptoms are monitored and maintained or improved  Outcome: Progressing

## 2024-02-09 ENCOUNTER — APPOINTMENT (OUTPATIENT)
Dept: CARDIOLOGY | Facility: HOSPITAL | Age: 69
End: 2024-02-09
Payer: MEDICARE

## 2024-02-09 VITALS
HEART RATE: 74 BPM | TEMPERATURE: 98.1 F | WEIGHT: 153 LBS | HEIGHT: 66 IN | RESPIRATION RATE: 18 BRPM | BODY MASS INDEX: 24.59 KG/M2 | SYSTOLIC BLOOD PRESSURE: 117 MMHG | DIASTOLIC BLOOD PRESSURE: 71 MMHG | OXYGEN SATURATION: 92 %

## 2024-02-09 PROBLEM — R07.9 CHEST PAIN: Status: RESOLVED | Noted: 2024-02-08 | Resolved: 2024-02-09

## 2024-02-09 LAB
ATRIAL RATE: 78 BPM
CHOLEST SERPL-MCNC: 233 MG/DL (ref 0–199)
CHOLESTEROL/HDL RATIO: 7.1
HDLC SERPL-MCNC: 32.7 MG/DL
HOLD SPECIMEN: NORMAL
LDLC SERPL CALC-MCNC: ABNORMAL MG/DL
MAGNESIUM SERPL-MCNC: 1.94 MG/DL (ref 1.6–2.4)
NON HDL CHOLESTEROL: 200 MG/DL (ref 0–149)
P AXIS: 53 DEGREES
P OFFSET: 201 MS
P ONSET: 139 MS
PR INTERVAL: 178 MS
Q ONSET: 228 MS
QRS COUNT: 13 BEATS
QRS DURATION: 80 MS
QT INTERVAL: 398 MS
QTC CALCULATION(BAZETT): 453 MS
QTC FREDERICIA: 434 MS
R AXIS: -16 DEGREES
T AXIS: 68 DEGREES
T OFFSET: 427 MS
TRIGL SERPL-MCNC: 674 MG/DL (ref 0–149)
VENTRICULAR RATE: 78 BPM
VLDL: ABNORMAL

## 2024-02-09 PROCEDURE — G0378 HOSPITAL OBSERVATION PER HR: HCPCS

## 2024-02-09 PROCEDURE — 2500000004 HC RX 250 GENERAL PHARMACY W/ HCPCS (ALT 636 FOR OP/ED): Performed by: NURSE PRACTITIONER

## 2024-02-09 PROCEDURE — 36415 COLL VENOUS BLD VENIPUNCTURE: CPT | Performed by: NURSE PRACTITIONER

## 2024-02-09 PROCEDURE — 99231 SBSQ HOSP IP/OBS SF/LOW 25: CPT | Performed by: NURSE PRACTITIONER

## 2024-02-09 PROCEDURE — 2500000001 HC RX 250 WO HCPCS SELF ADMINISTERED DRUGS (ALT 637 FOR MEDICARE OP): Performed by: NURSE PRACTITIONER

## 2024-02-09 PROCEDURE — 80061 LIPID PANEL: CPT | Performed by: NURSE PRACTITIONER

## 2024-02-09 PROCEDURE — 83735 ASSAY OF MAGNESIUM: CPT | Performed by: NURSE PRACTITIONER

## 2024-02-09 PROCEDURE — 2500000001 HC RX 250 WO HCPCS SELF ADMINISTERED DRUGS (ALT 637 FOR MEDICARE OP): Performed by: STUDENT IN AN ORGANIZED HEALTH CARE EDUCATION/TRAINING PROGRAM

## 2024-02-09 PROCEDURE — 93017 CV STRESS TEST TRACING ONLY: CPT

## 2024-02-09 RX ADMIN — HEPARIN SODIUM 5000 UNITS: 5000 INJECTION INTRAVENOUS; SUBCUTANEOUS at 06:44

## 2024-02-09 RX ADMIN — CHLORTHALIDONE 25 MG: 25 TABLET ORAL at 08:44

## 2024-02-09 RX ADMIN — GABAPENTIN 300 MG: 300 CAPSULE ORAL at 08:44

## 2024-02-09 RX ADMIN — ASPIRIN 81 MG 81 MG: 81 TABLET ORAL at 08:44

## 2024-02-09 RX ADMIN — METOPROLOL TARTRATE 25 MG: 25 TABLET, FILM COATED ORAL at 08:44

## 2024-02-09 RX ADMIN — LOSARTAN POTASSIUM 100 MG: 100 TABLET, FILM COATED ORAL at 08:44

## 2024-02-09 ASSESSMENT — COGNITIVE AND FUNCTIONAL STATUS - GENERAL
MOBILITY SCORE: 24
DAILY ACTIVITIY SCORE: 24

## 2024-02-09 ASSESSMENT — PAIN SCALES - GENERAL
PAINLEVEL_OUTOF10: 0 - NO PAIN
PAINLEVEL_OUTOF10: 0 - NO PAIN

## 2024-02-09 ASSESSMENT — PAIN - FUNCTIONAL ASSESSMENT: PAIN_FUNCTIONAL_ASSESSMENT: 0-10

## 2024-02-09 NOTE — DISCHARGE SUMMARY
"Discharge Diagnosis  Chest pain  Hyper triglycerides     Issues Requiring Follow-Up  Follow up with your PCP within 1 week of discharge    Discharge Meds     Your medication list        ASK your doctor about these medications        Instructions Last Dose Given Next Dose Due   chlorthalidone 25 mg tablet  Commonly known as: Hygroton           Co Q-10 100 mg capsule  Generic drug: coenzyme Q-10           estradiol 0.05 mg/24 hr patch  Commonly known as: Climara      PLACE 1 PATCH OVER 96 HOURS ON THE SKIN 1 (ONE) TIME PER WEEK.       estradiol 0.05 mg/24 hr patch  Commonly known as: Vivelle-DOT      Place 1 patch on the skin 2 times a week.       gabapentin 300 mg capsule  Commonly known as: Neurontin           losartan 100 mg tablet  Commonly known as: Cozaar           potassium chloride ER 10 mEq ER capsule  Commonly known as: Micro-K           pravastatin 20 mg tablet  Commonly known as: Pravachol           traZODone 50 mg tablet  Commonly known as: Desyrel                    Test Results Pending At Discharge  Pending Labs       No current pending labs.            Hospital Course  Jen Simpson is a 69 y.o. female presents to [facility] ER with history of hypertension hyperlipidemia, who presents today for evaluation of chest pain that started while sitting having a cup of coffee. Patient states she experienced left arm pain that radiated to her left side of the chest, described as an \"ache\" rated a 5 out of 1-10. She described some shortness of breath, no nausea, vomiting or diaphoresis. She lives in [place] and here visiting her daughter. EMS was called, they gave her 4 aspirins and nitroglycerin, the nitroglycerin. She believes the chest pain resolved enroute but prior to nitroglycerin administration. She does use estrogen patches. No recent surgeries hospitalizations. Had recent travel aside from a 2-hour flight from [place] to [place] and then [place] to [place] during which patient was walking " around every 2 hours, per ER report.   She denies history of diabetes or family history of heart disease. Patient admitted for ACS r/o, cardiology consulted.  Course: Troponin 3,3,3. A1C 5.7  Elevated triglycerides 674,  and cholesterol 233. Seen by cardiology and underwent cardiac stress test. Per cardiology ok to discharge to home  Stress test:   Procedure Findings:   Stress echocardiogram     Description of the Procedure:   Patient did 9 minutes on the Josias protocol.   No EKG changes   No arrhythmias   No chest discomfort   Echocardiogram normal at rest and with a normal response to stress.   Very mild aortic valve sclerosis but no stenosis.   Low risk for significant HemaCart disease.   Continue risk factor management medical therapy and follow with cardiology as needed and with primary care doctor on routine basis.     Discussed with the patient.  Okay for discharge to home.  Pertinent Physical Exam At Time of Discharge  Physical Exam  Constitutional: Well developed, awake/alert/oriented x3, no distress, alert and cooperative   ENMT: mucous membranes moist   Head/Neck: Neck supple   Respiratory/Thorax: Patent airways, CTAB, normal breath sounds with good chest expansion, thorax symmetric   Cardiovascular: Regular, rate and rhythm, no murmurs, 2+ equal pulses of the extremities, normal S 1and S 2   Gastrointestinal: Nondistended, soft, non-tender, no rebound tenderness or guarding, no masses palpable, no organomegaly, +BS, no bruits   Musculoskeletal: ROM intact, no joint swelling, normal strength   Extremities: normal extremities, no cyanosis edema, contusions or wounds, no clubbing   Neurological: alert and oriented x3, intact senses, motor, response and reflexes, normal strength     Outpatient Follow-Up  Future Appointments   Date Time Provider Department Center   9/25/2024  8:45 AM Corinne A Bazella, MD [id number] [facility]  [id number]  [place] [address]  Phone: [phone] Fax: [phone]  MARGAUX Mir-CNP

## 2024-02-09 NOTE — DISCHARGE INSTRUCTIONS
Follow up with your PCP within 1 week of discharge upon returning to [place], patient is resident of [place] and visiting her daughter here in [place].

## 2024-02-09 NOTE — BRIEF OP NOTE
Physician Transition of Care Summary  Invasive Cardiovascular Lab    Procedure Date: 2/9/2024  Attending: * Surgery not found *  Resident/Fellow/Other Assistant: * Surgery not found *    Indications:   * No surgery found *    Post-procedure diagnosis:   * No surgery found *    Procedure(s):   * Cannot find OR case *  * No surgery found *    Procedure Findings:   Stress echocardiogram    Description of the Procedure:   Patient did 9 minutes on the Josias protocol.    No EKG changes    No arrhythmias    No chest discomfort    Echocardiogram normal at rest and with a normal response to stress.    Very mild aortic valve sclerosis but no stenosis.    Low risk for significant HemaCart disease.    Continue risk factor management medical therapy and follow with cardiology as needed and with primary care doctor on routine basis.    Discussed with the patient.  Okay for discharge to home.    Complications:   None    Stents/Implants:       Anticoagulation/Antiplatelet Plan:   None    Estimated Blood Loss:   * No surgery found *    Anesthesia: * No surgery found * Anesthesia Staff: Anesthesia staff cannot be found from this context.    Any Specimen(s) Removed:   * Cannot find OR log *    Disposition:   Okay for discharge to home      Electronically signed by: Adelfo Diallo MD, 2/9/2024 2:34 PM

## 2024-02-09 NOTE — PROGRESS NOTES
02/09/24 1113   Discharge Planning   Living Arrangements Alone   Support Systems Children   Assistance Needed none   Type of Residence Private residence   Home or Post Acute Services None   Patient expects to be discharged to: home   Does the patient need discharge transport arranged? No     Met  with the patient and her daughter in the room. She gave permission  to speak with her daughter present.The patient is currently visiting locally, lives in [place]. She plans to return to [place] on Sunday. The patient currently plans to return home when discharged. Her daughter will drive her home

## 2024-02-09 NOTE — CARE PLAN
The patient's goals for the shift include pain control    The clinical goals for the shift include pt will have no episodes of CP this shift    Goals progressing, safety maintained. Pt NPO for stress test today.

## 2024-02-09 NOTE — CONSULTS
Consults  History Of Present Illness:    Jen Simpson is a 69 y.o. female presenting with left shoulder tingling that radiated to her left upper chest.  Lasted 30 minutes.  It occurred yesterday evening.  The patient had no shortness of breath syncope palpitations or other new complaint.  She had been lifting some baggage traveling from [place] on the airplane.  No cough hemoptysis.  Functional wheeze.  No fever chills.  In the emergency room she had 4 baby aspirin which seem to help the discomfort in the get a nitroglycerin and she is not sure if that helped or not.    She is normally physically very active does exercise without any difficulty.  Has no further episodes here in the hospital.    It is noteworthy that she had a calcium score of 52.3 in 2021.  This was in the left anterior sending artery    Past surgical history includes a tonsillectomy, 3 back surgery, hysterectomy with oophorectomy, skin biopsy, colonoscopy,    She reports that she never used tobacco or alcohol.  She lives in [place].  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    Family history is negative for significant premature coronary disease.    Past medical history.  Includes hypertension tension hyperlipidemia.  No history of stroke rheumatic fever heart murmur claudication deep vein thrombosis.    Gen.: There is no weight change, appetite is normal, there are no colds, flus, fevers or chills.  Eyes: Vision is normal and there is no eye pain.,  She wears glasses  ENT: No sore throat, sinus congestion or nasal drainage  Cardiac: See history of present illness   Pulmonary: No shortness of breath, cough, hemoptysis, sleep apnea, wheeze, asthma or emphysema   Heme/lymph: No swollen glands, cancer  GI: No abdominal pain, change in bowel habits, melena, hematemesis, hematochezia, nausea, vomiting, diarrhea.  History of fatty liver.  : No discharge, dysuria, frequency, urgency, hematuria, kidney failure or kidney stones.  Musculoskeletal: No limb pain, joint  pain, joint swelling, arthritis  Skin: No rashes, bruising or bleeding  Psych: No depression, has had mild anxiety in the past there is no memory loss  Endocrine; no diabetes or thyroid disease, she is a vitamin D deficiency  Neurologic: No weakness, numbness, loss of balance, seizure disorder or history of stroke  Review of systems is otherwise negative unless stated above or in history of present illness.       Last Recorded Vitals:  Vitals:    02/08/24 2000 02/09/24 0000 02/09/24 0800 02/09/24 1200   BP: 129/60 114/60 120/65 106/64   BP Location: Left arm Left arm Left arm Left arm   Patient Position: Sitting  Lying Lying   Pulse: 87 78 72 67   Resp: 16 18 16 16   Temp: 35.7 °C (96.3 °F) 36.3 °C (97.3 °F) 36.4 °C (97.5 °F) 36.6 °C (97.9 °F)   TempSrc: Temporal Temporal Temporal Temporal   SpO2: 98% 97% 92% 97%   Weight:       Height:           Last Labs:  CBC - 2/8/2024:  2:04 PM  5.4 16.0 124    46.0      CMP - 2/8/2024:  2:04 PM  9.3 6.6 30 --- 0.5   _ 4.1 16 38      PTT - 2/8/2024:  2:04 PM  1.0   11.0 30     Troponin I, High Sensitivity   Date/Time Value Ref Range Status   02/08/2024 02:04 PM <3 0 - 13 ng/L Final   02/08/2024 10:58 AM <3 0 - 13 ng/L Final   02/08/2024 09:31 AM <3 0 - 13 ng/L Final     BNP   Date/Time Value Ref Range Status   02/08/2024 09:31 AM 8 0 - 99 pg/mL Final     Hemoglobin A1C   Date/Time Value Ref Range Status   02/08/2024 02:04 PM 5.7 (H) see below % Final   07/28/2022 10:41 AM 5.7 (A) % Final     Comment:          Diagnosis of Diabetes-Adults   Non-Diabetic: < or = 5.6%   Increased risk for developing diabetes: 5.7-6.4%   Diagnostic of diabetes: > or = 6.5%  .       Monitoring of Diabetes                Age (y)     Therapeutic Goal (%)   Adults:          >18           <7.0   Pediatrics:    13-18           <7.5                   7-12           <8.0                   0- 6            7.5-8.5   American Diabetes Association. Diabetes Care 33(S1), Jan 2010.       LDL Calculated  "  Date/Time Value Ref Range Status   02/09/2024 06:25 AM   Final     Comment:     The calculation of LDL and VLDL are inaccurate when the Triglycerides are greater than 400 mg/dL or when the patient is non-fasting. If LDL measurement is necessary contact the testing laboratory for an alternative LDL assay.                                  Near   Borderline      AGE      Desirable  Optimal    High     High     Very High     0-19 Y     0 - 109     ---    110-129   >/= 130     ----    20-24 Y     0 - 119     ---    120-159   >/= 160     ----      >24 Y     0 -  99   100-129  130-159   160-189     >/=190       VLDL   Date/Time Value Ref Range Status   02/09/2024 06:25 AM   Final     Comment:     Unable to calculate VLDL.   07/28/2022 10:41 AM 31 0 - 40 mg/dL Final   06/27/2018 10:46 AM 47 (H) 0 - 40 mg/dL Final      Last I/O:  No intake/output data recorded.    Past Cardiology Tests (Last 3 Years):  EKG:  ECG 12 lead 02/08/2024    Echo:    CARDIOMEDIASTINAL SILHOUETTE:  Cardiomediastinal silhouette is normal in size and configuration.      LUNGS:  No consolidation, pneumothorax, or effusion.      ABDOMEN:  No remarkable upper abdominal findings.      BONES:  No acute osseous changes.      Remaining findings appear stable since prior comparison radiograph.      IMPRESSION:  1.  No evidence of acute cardiopulmonary process.  No results found for this or any previous visit from the past 1095 days.    Ejection Fractions:  No results found for: \"EF\"  Cath:  No results found for this or any previous visit from the past 1095 days.    Stress Test:  No results found for this or any previous visit from the past 1095 days.    Cardiac Imaging:  No results found for this or any previous visit from the past 1095 days.      Past Medical History:  She has a past medical history of Abnormal PET scan of lung (01/14/2010), Abnormal screening cardiac CT, Abnormal screening cardiac CT (06/26/2015), H/O bone density study (11/04/2019), H/O " cardiovascular stress test (12/23/2009), H/O magnetic resonance imaging of lumbar spine (11/08/2022), and H/O x-ray of lumbar spine (05/28/2021).    Past Surgical History:  She has a past surgical history that includes Tonsillectomy (06/06/2014); Back surgery (06/06/2014); Hysterectomy (06/06/2014); Biopsy skin / SQ / mucous membrane (09/22/2022); Colonoscopy (08/22/2017); and Skin biopsy (10/03/2014).      Social History:  She reports that she has never smoked. She has never used smokeless tobacco. She reports that she does not use drugs. No history on file for alcohol use.    Family History:  No family history on file.     Allergies:  Patient has no known allergies.    Inpatient Medications:  Scheduled medications   Medication Dose Route Frequency    aspirin  81 mg oral Daily    [Held by provider] atorvastatin  80 mg oral Nightly    chlorthalidone  25 mg oral Daily    gabapentin  300 mg oral BID    heparin (porcine)  5,000 Units subcutaneous q8h TYREE    losartan  100 mg oral Daily    metoprolol tartrate  25 mg oral BID     PRN medications   Medication    acetaminophen    Or    acetaminophen    Or    acetaminophen    morphine    nitroglycerin    ondansetron    oxygen    polyethylene glycol    traZODone     Continuous Medications   Medication Dose Last Rate     Outpatient Medications:  Current Outpatient Medications   Medication Instructions    chlorthalidone (HYGROTON) 25 mg, oral, Daily    coenzyme Q-10 (CO Q-10) 100 mg, oral, Daily    estradiol (Climara) 0.05 mg/24 hr patch PLACE 1 PATCH OVER 96 HOURS ON THE SKIN 1 (ONE) TIME PER WEEK.    estradiol (Vivelle-DOT) 0.05 mg/24 hr patch 1 patch, transdermal, 2 times weekly    gabapentin (NEURONTIN) 300 mg, oral, 2 times daily    losartan (COZAAR) 100 mg, oral, Daily    potassium chloride ER (Micro-K) 10 mEq ER capsule 10 mEq, oral, 2 times daily, Do not crush or chew.    pravastatin (PRAVACHOL) 20 mg, oral, Daily    traZODone (DESYREL) 50 mg, oral, Nightly PRN        Physical Exam:    Physical Exam: Medium build woman appears much younger than stated age who appears fit.    Appearance: Well-developed, well-nourished in no apparent distress    Skin: Intact,  dry skin, no lesions, rash, petechiae or purpura. Skin is warm and dry with good color    Eyes: PERRLA, EOMs intact,  Conjunctiva pink with no redness or exudates, Eyelids without lesions. No scleral icterus.     ENT: Hearing grossly intact.  Nares patent, mucus membranes moist. Throat clear .    Neck: Supple, without meningismus. Thyroid not palpable. Trachea at midline. No lymphadenopathy. Normal carotid upstroke without bruit no jugular venous distention    Pulmonary: Clear bilaterally with good chest wall excursion. No rales, rhonchi or wheezing. No accessory muscle use or stridor. No deformity    Cardiac: Normal S1, S2 without murmur, rub, gallop or extrasystole.  Point of maximal impulse is not displaced    Abdomen: Soft, nontender, active bowel sounds.  No palpable organomegaly.  No rebound or guarding.  No CVA tenderness.    Genitourinary: Exam deferred.    Musculoskeletal: No obvious deformity.    Extremities: No cyanosis, clubbing, edema    Neurological: Facial muscles symmetric, patient moves all 4 extremities equally. Patient walks without assistance    Psychiatric: Appropriate mood and affect. Insight is good    Vascular: Pulses are 2+ in the upper and lower extremities and equal bilaterally with no radial femoral delay       Assessment/Plan   #1 atypical angina in a patient with a modest coronary calcium score from 3 years ago with negative cardiac enzymes and no evolution EKG.  No overt heart failure or arrhythmia.  Will plan for stress echocardiogram.  Continue to modify risk factors.  No overt heart failure or arrhythmia.    Discussed with the patient  Peripheral IV 02/08/24 22 G Right Wrist (Active)   Site Assessment Clean;Dry 02/09/24 0849   Dressing Status Clean;Dry 02/09/24 0849   Number of days: 1        Code Status:  DNR    I spent 60 minutes in the professional and overall care of this patient.        Adelfo Diallo MD

## 2024-02-12 ENCOUNTER — PATIENT OUTREACH (OUTPATIENT)
Dept: CARE COORDINATION | Facility: CLINIC | Age: 69
End: 2024-02-12
Payer: MEDICARE

## 2024-09-04 DIAGNOSIS — I10 HYPERTENSION, ESSENTIAL, BENIGN: Primary | ICD-10-CM

## 2024-09-04 RX ORDER — LOSARTAN POTASSIUM 100 MG/1
100 TABLET ORAL DAILY
Qty: 90 TABLET | Refills: 2 | Status: SHIPPED | OUTPATIENT
Start: 2024-09-04

## 2024-09-25 ENCOUNTER — APPOINTMENT (OUTPATIENT)
Dept: OBSTETRICS AND GYNECOLOGY | Facility: CLINIC | Age: 69
End: 2024-09-25
Payer: MEDICARE

## 2024-09-25 VITALS
DIASTOLIC BLOOD PRESSURE: 78 MMHG | SYSTOLIC BLOOD PRESSURE: 130 MMHG | WEIGHT: 143 LBS | HEIGHT: 66 IN | BODY MASS INDEX: 22.98 KG/M2

## 2024-09-25 DIAGNOSIS — Z78.0 MENOPAUSE: ICD-10-CM

## 2024-09-25 DIAGNOSIS — Z12.4 CERVICAL CANCER SCREENING: Primary | ICD-10-CM

## 2024-09-25 PROCEDURE — 87624 HPV HI-RISK TYP POOLED RSLT: CPT

## 2024-09-25 PROCEDURE — G0101 CA SCREEN;PELVIC/BREAST EXAM: HCPCS | Performed by: OBSTETRICS & GYNECOLOGY

## 2024-09-25 PROCEDURE — 1126F AMNT PAIN NOTED NONE PRSNT: CPT | Performed by: OBSTETRICS & GYNECOLOGY

## 2024-09-25 PROCEDURE — 3075F SYST BP GE 130 - 139MM HG: CPT | Performed by: OBSTETRICS & GYNECOLOGY

## 2024-09-25 PROCEDURE — 1160F RVW MEDS BY RX/DR IN RCRD: CPT | Performed by: OBSTETRICS & GYNECOLOGY

## 2024-09-25 PROCEDURE — 3078F DIAST BP <80 MM HG: CPT | Performed by: OBSTETRICS & GYNECOLOGY

## 2024-09-25 PROCEDURE — 1036F TOBACCO NON-USER: CPT | Performed by: OBSTETRICS & GYNECOLOGY

## 2024-09-25 PROCEDURE — 1159F MED LIST DOCD IN RCRD: CPT | Performed by: OBSTETRICS & GYNECOLOGY

## 2024-09-25 RX ORDER — ESTRADIOL 0.05 MG/D
1 FILM, EXTENDED RELEASE TRANSDERMAL 2 TIMES WEEKLY
Qty: 26 PATCH | Refills: 4 | Status: SHIPPED | OUTPATIENT
Start: 2024-09-26

## 2024-09-25 ASSESSMENT — PAIN SCALES - GENERAL: PAINLEVEL: 0-NO PAIN

## 2024-09-25 NOTE — PROGRESS NOTES
"[facility] ( Breast and Pelvic exam ) Patch refill    Last pap: 9.20.23 ( LSIL +OTHER )  Last mammo: 8.2024 (neg )  Last Colonoscopy: 8.4.17 ( NEG ) 10yrs    Chaperone declined: Meli Hunt, CMA3      Jen Simpson is a 69 y.o. female who is here for a routine exam. PCP = No primary care provider on file.    HPI: She had a stress test and it was totally normal and she got a good report.  We discussed decreasing the patch dose- she does not want to do that.     GYN: hysterectomy for endo- long term abnormal vaginal apex Paps and HPV other pos  Social: she is Omi's mom my patient's wife. She lives in [facility].      Objective   /78   Ht 1.676 m (5' 6\")   Wt 64.9 kg (143 lb)   BMI 23.08 kg/m²   General: Alert and oriented, in no acute distress  HEENT: nl thyroid  Abd: soft NT  Breast exam: Normal to palpation bilaterally without masses, skin changes, or nipple discharge.   External genitalia: Normal architecture without erythema, masses, or lesions.   Urethra: and urethral meatus:  normal   Bladder: no tenderness to palpation  Vagina: mucosa without lesions, masses, or atrophy. No abnormal vaginal discharge.   Cervix: surg absent  Uterus: surg absent  Adnexa/parametria: Normal without masses or lesions  Anus and perineum: Normal external inspection of onel-anus and external anus  Psych: Normal affect. Normal mood.     Annual  colonoscopy in 2027  Pap and HPV after colpo 2023 was LGSIL HPV pos other- can do colpo over christmas is abnormal  Patch renewed- she does not want to decrease dose.  mammogram- in [place]        No orders of the defined types were placed in this encounter.      Problem List Items Addressed This Visit    None  Visit Diagnoses       Cervical cancer screening    -  Primary             Thank you for coming to your annual exam.    "

## 2024-10-09 LAB
CYTOLOGY CMNT CVX/VAG CYTO-IMP: NORMAL
HPV HR 12 DNA GENITAL QL NAA+PROBE: POSITIVE
HPV HR GENOTYPES PNL CVX NAA+PROBE: POSITIVE
HPV16 DNA SPEC QL NAA+PROBE: NEGATIVE
HPV18 DNA SPEC QL NAA+PROBE: NEGATIVE
LAB AP HPV GENOTYPE QUESTION: YES
LAB AP HPV HR: NORMAL
LAB AP PREVIOUS ABNORMAL HISTORY: NORMAL
LABORATORY COMMENT REPORT: NORMAL
MENSTRUAL HX REPORTED: NORMAL
PATH REPORT.TOTAL CANCER: NORMAL

## 2024-10-10 ENCOUNTER — TELEPHONE (OUTPATIENT)
Dept: OBSTETRICS AND GYNECOLOGY | Facility: CLINIC | Age: 69
End: 2024-10-10
Payer: MEDICARE

## 2024-10-10 NOTE — TELEPHONE ENCOUNTER
Pt contacted.     Discussed HPV on pap.    Pt informed needs colposcopy per dr thomas.     Pt transferred to  to schedule.

## 2024-10-10 NOTE — TELEPHONE ENCOUNTER
----- Message from Corinne Bazella sent at 10/9/2024  3:34 PM EDT -----  Can she please schedule for a colpo over the [place] visit?  The pap still has HPV. thanks

## 2024-11-20 ENCOUNTER — APPOINTMENT (OUTPATIENT)
Dept: OBSTETRICS AND GYNECOLOGY | Facility: CLINIC | Age: 69
End: 2024-11-20
Payer: MEDICARE

## 2024-11-20 VITALS
DIASTOLIC BLOOD PRESSURE: 72 MMHG | BODY MASS INDEX: 22.98 KG/M2 | WEIGHT: 143 LBS | SYSTOLIC BLOOD PRESSURE: 136 MMHG | HEIGHT: 66 IN

## 2024-11-20 DIAGNOSIS — B97.7 HPV IN FEMALE: Primary | ICD-10-CM

## 2024-11-20 PROCEDURE — 57420 EXAM OF VAGINA W/SCOPE: CPT | Performed by: OBSTETRICS & GYNECOLOGY

## 2024-11-20 RX ORDER — EVOLOCUMAB 140 MG/ML
INJECTION, SOLUTION SUBCUTANEOUS
COMMUNITY
Start: 2024-10-22

## 2024-11-20 RX ORDER — CHOLECALCIFEROL (VITAMIN D3) 50 MCG
TABLET ORAL
COMMUNITY

## 2024-11-20 RX ORDER — CALCIUM CARBONATE 500(1250)
1 TABLET,CHEWABLE ORAL DAILY
COMMUNITY

## 2024-11-20 ASSESSMENT — PAIN SCALES - GENERAL: PAINLEVEL_OUTOF10: 0-NO PAIN

## 2024-11-20 NOTE — PROGRESS NOTES
Patient ID: Jen Simpson is a 69 y.o. female.  Here for a Procedure    Visit Vitals  /72        Colposcopy    Date/Time: 11/20/2024 8:36 AM    Performed by: Corinne A Bazella, MD  Authorized by: Corinne A Bazella, MD    Procedure location: vagina    Consent:     Patient questions answered: yes      Risks and benefits of the procedure and its alternatives discussed: yes      Procedural risks discussed:  Bleeding    Consent obtained:  Written    Consent given by:  Patient  Universal Protocol:     A time out verifies correct patient, procedure, equipment, support staff, and site/side marked as required: yes      Patient states understanding of procedure being performed: yes      Relevant documents present and verified: yes      Test results available and properly labeled: yes      Imaging studies available: yes      Required blood products, implants, devices, and special equipment available: yes      Side/site marked: yes    Indication:     Vaginal indication(s): vaginal LSIL    Pre-procedure:     Speculum was placed in the vagina: yes      Prep solution(s): Lugol's iodine    Procedure:     Colposcopy with: colposcopy only      Vaginal impression: normal/benign    Post-procedure:     Patient tolerance of procedure:  Patient tolerated the procedure well with no immediate complications    Instructions and paperwork completed: yes    Comments:      Lugols staining no abnormalities noted.

## 2024-12-13 ENCOUNTER — TELEPHONE (OUTPATIENT)
Dept: OBSTETRICS AND GYNECOLOGY | Facility: CLINIC | Age: 69
End: 2024-12-13
Payer: MEDICARE

## 2024-12-13 DIAGNOSIS — Z78.0 MENOPAUSE: ICD-10-CM

## 2024-12-13 RX ORDER — ESTRADIOL 0.05 MG/D
1 FILM, EXTENDED RELEASE TRANSDERMAL 2 TIMES WEEKLY
Qty: 26 PATCH | Refills: 4 | Status: SHIPPED | OUTPATIENT
Start: 2024-12-16

## 2024-12-13 NOTE — TELEPHONE ENCOUNTER
Upon chart review, pt last seen by dr thomas a month ago.  She lives in [place] during winter months.  Refills on her estrogen patch sent to provider to approve.

## 2025-02-20 ENCOUNTER — TELEPHONE (OUTPATIENT)
Dept: OBSTETRICS AND GYNECOLOGY | Facility: CLINIC | Age: 70
End: 2025-02-20
Payer: MEDICARE

## 2025-02-20 NOTE — TELEPHONE ENCOUNTER
Pt was last seen for colpo 11/20/24. No changes were noted and sampling was not needed. Next pap is due 11/2025. Pt is aware. She stated that Dr. Prakash does not have any availabilities until 12/2025 and she goes to [facility] for the jay. Usually leaves in Oct.-early Nov. She was transferred to the  to make an appt and be placed on a cancellation list. Pt will also call the office periodically throughout the summer to check for availabilities.

## 2025-06-28 DIAGNOSIS — I10 HYPERTENSION, ESSENTIAL, BENIGN: ICD-10-CM

## 2025-06-30 RX ORDER — LOSARTAN POTASSIUM 100 MG/1
100 TABLET ORAL DAILY
Qty: 90 TABLET | Refills: 3 | Status: SHIPPED | OUTPATIENT
Start: 2025-06-30

## 2025-10-01 ENCOUNTER — APPOINTMENT (OUTPATIENT)
Dept: OBSTETRICS AND GYNECOLOGY | Facility: CLINIC | Age: 70
End: 2025-10-01
Payer: MEDICARE